# Patient Record
Sex: FEMALE | Race: WHITE | NOT HISPANIC OR LATINO | URBAN - METROPOLITAN AREA
[De-identification: names, ages, dates, MRNs, and addresses within clinical notes are randomized per-mention and may not be internally consistent; named-entity substitution may affect disease eponyms.]

---

## 2020-10-09 ENCOUNTER — OFFICE VISIT (OUTPATIENT)
Dept: URBAN - METROPOLITAN AREA CLINIC 27 | Facility: CLINIC | Age: 20
End: 2020-10-09

## 2020-10-09 PROBLEM — 444728005 CHRONIC INFLAMMATORY DEMYELINATING POLYNEUROPATHY: Status: ACTIVE | Noted: 2020-10-09

## 2020-10-09 PROBLEM — 16932000 NAUSEA AND VOMITING: Status: ACTIVE | Noted: 2020-10-09

## 2020-10-09 PROBLEM — 442728009 NON-CELIAC GLUTEN SENSITIVITY: Status: ACTIVE | Noted: 2020-10-09

## 2020-10-09 PROBLEM — 102614006 GENERALIZED ABDOMINAL PAIN: Status: ACTIVE | Noted: 2020-10-09

## 2020-10-09 PROBLEM — 266435005 GASTRO-ESOPHAGEAL REFLUX DISEASE WITHOUT ESOPHAGITIS: Status: ACTIVE | Noted: 2020-10-09

## 2020-10-28 ENCOUNTER — LAB OUTSIDE AN ENCOUNTER (OUTPATIENT)
Dept: URBAN - METROPOLITAN AREA CLINIC 121 | Facility: CLINIC | Age: 20
End: 2020-10-28

## 2020-10-28 LAB
A/G RATIO: (no result)
ALBUMIN: 4.2
ALK PHOS: 49
AMYLASE: 51
BASOPH COUNT: (no result)
BASOPHIL %: 0.7
BG RANDOM: 78
BILI TOTAL: 0.7
BUN/CREAT: (no result)
BUN: 12
CALCIUM: 9.3
CHLORIDE: 105
CO2: 23
CREATININE: 0.42
EOS COUNT: (no result)
EOSINOPHIL %: 1.7
FERRITIN: 57
GLOBULIN TOT: (no result)
HCT: 41.2
HGB: 14.1
HGBA1C: (no result)
IRON SATUR %: (no result)
IRON: 150
LYMPHS %: 30
MCH: 32.6
MCHC: (no result)
MCV: 95.2
MONOCYTE %: 8.1
MONOSCT AUTO: (no result)
PLATELETS: (no result)
PMN %: 59.5
POTASSIUM: 4.1
PROTEIN, TOT: 7
RBC: (no result)
RDW: 12.1
SGOT (AST): 18
SGPT (ALT): 12
TIBC: (no result)
TSH: 1
WBC: (no result)
ZZ-GE-UNK: (no result)

## 2020-11-06 ENCOUNTER — OFFICE VISIT (OUTPATIENT)
Dept: URBAN - METROPOLITAN AREA CLINIC 27 | Facility: CLINIC | Age: 20
End: 2020-11-06

## 2020-11-06 PROBLEM — 267425008 LACTOSE INTOLERANCE: Status: ACTIVE | Noted: 2020-11-06

## 2020-12-08 ENCOUNTER — OFFICE VISIT (OUTPATIENT)
Dept: URBAN - METROPOLITAN AREA SURGERY CENTER 7 | Facility: SURGERY CENTER | Age: 20
End: 2020-12-08

## 2022-04-30 ENCOUNTER — TELEPHONE ENCOUNTER (OUTPATIENT)
Dept: URBAN - METROPOLITAN AREA CLINIC 121 | Facility: CLINIC | Age: 22
End: 2022-04-30

## 2022-05-01 ENCOUNTER — TELEPHONE ENCOUNTER (OUTPATIENT)
Dept: URBAN - METROPOLITAN AREA CLINIC 121 | Facility: CLINIC | Age: 22
End: 2022-05-01

## 2022-05-01 RX ORDER — ONDANSETRON 8 MG/1
TAKE 1/2 OR 1 TABLET BY MOUTH EVERY 8HRS AS NEEDED FOR NAUSEA TABLET ORAL
Status: ACTIVE | COMMUNITY
Start: 2020-10-09

## 2022-05-01 RX ORDER — HYOSCYAMINE SULFATE 0.12 MG/1
TAKE ONE OR TWO TABLETS BY MOUTH EVERY 6HRS AS NEEDED FOR ABDOMINAL PAIN TABLET ORAL
Status: ACTIVE | COMMUNITY
Start: 2020-10-09

## 2022-05-01 RX ORDER — PREDNISONE 20 MG/1
TABLET ORAL
Status: ACTIVE | COMMUNITY
Start: 2020-10-09

## 2023-10-27 ENCOUNTER — CLAIMS CREATED FROM THE CLAIM WINDOW (OUTPATIENT)
Dept: URBAN - METROPOLITAN AREA CLINIC 27 | Facility: CLINIC | Age: 23
End: 2023-10-27
Payer: COMMERCIAL

## 2023-10-27 ENCOUNTER — DASHBOARD ENCOUNTERS (OUTPATIENT)
Age: 23
End: 2023-10-27

## 2023-10-27 ENCOUNTER — TELEPHONE ENCOUNTER (OUTPATIENT)
Dept: URBAN - METROPOLITAN AREA CLINIC 27 | Facility: CLINIC | Age: 23
End: 2023-10-27

## 2023-10-27 VITALS
SYSTOLIC BLOOD PRESSURE: 130 MMHG | WEIGHT: 109 LBS | HEIGHT: 60 IN | BODY MASS INDEX: 21.4 KG/M2 | HEART RATE: 80 BPM | DIASTOLIC BLOOD PRESSURE: 103 MMHG

## 2023-10-27 DIAGNOSIS — R11.0 NAUSEA: ICD-10-CM

## 2023-10-27 DIAGNOSIS — R10.33 PERIUMBILICAL PAIN: ICD-10-CM

## 2023-10-27 PROCEDURE — 99244 OFF/OP CNSLTJ NEW/EST MOD 40: CPT | Performed by: INTERNAL MEDICINE

## 2023-10-27 PROCEDURE — 99204 OFFICE O/P NEW MOD 45 MIN: CPT | Performed by: INTERNAL MEDICINE

## 2023-10-27 RX ORDER — ONDANSETRON 4 MG/1
1 TABLET ON THE TONGUE AND ALLOW TO DISSOLVE TABLET, ORALLY DISINTEGRATING ORAL EVERY 6 HOURS
Qty: 30 TABLET | Refills: 1 | OUTPATIENT
Start: 2023-10-27

## 2023-10-27 RX ORDER — ONDANSETRON 8 MG/1
TAKE 1/2 OR 1 TABLET BY MOUTH EVERY 8HRS AS NEEDED FOR NAUSEA TABLET ORAL
Status: ACTIVE | COMMUNITY
Start: 2020-10-09

## 2023-10-27 RX ORDER — HYOSCYAMINE SULFATE 0.12 MG/1
TAKE ONE OR TWO TABLETS BY MOUTH EVERY 6HRS AS NEEDED FOR ABDOMINAL PAIN TABLET ORAL
Status: ACTIVE | COMMUNITY
Start: 2020-10-09

## 2023-10-27 RX ORDER — HYOSCYAMINE SULFATE 0.12 MG/1
1 TABLET UNDER THE TONGUE AND ALLOW TO DISSOLVE AS NEEDED TABLET, ORALLY DISINTEGRATING ORAL THREE TIMES A DAY
Qty: 90 | Refills: 1 | OUTPATIENT
Start: 2023-10-27 | End: 2023-12-25

## 2023-10-27 RX ORDER — PREDNISONE 20 MG/1
TABLET ORAL
Status: ACTIVE | COMMUNITY
Start: 2020-10-09

## 2023-10-27 NOTE — PHYSICAL EXAM HENT:
Head,  normocephalic,  atraumatic,  Face,  Face within normal limits,  Ears,  External ears within normal limits,  Nose/Nasopharynx,  External nose  normal appearance,  nares patent,  no nasal discharge,  Lips,  Appearance normal

## 2023-10-27 NOTE — HPI-TODAY'S VISIT:
Ms Lehman is a 23-year-old established patient of Dr. Cruz seen at the request of Dr. Beba Ramírez  for nausea, vomiting, abdominal pain. A copy of this document will be sent to the referring physician. She was in her usual state of health until 3 weeks ago. Her initial symptom was mild abdominal discomfort followed by vomiting. She has constant nausea that is worse after eating. Zofran helps. The last emesis was 10 days ago. She has constant 7/10 periumbilical pain x 3 week. Not affected by eating, defecation or passage of flatus. She has a BM QOD. She denies melena, hematochezia or weight loss. She was seen at an Urgent care 2 weeks who prescribed Zofran and Bentyl. No improvement with taking Bentyl qid. She had hallucinations and stopped the medication. She saw Dr. Ramírez last week who started Omeprazole 40 mg q day, Carafate qid. She reports a 30% improvement since starting the medications.   EGD 12/2020 - mild erythema in distal esophagus and somewhat patulous esophagus.

## 2023-11-01 ENCOUNTER — OFFICE VISIT (OUTPATIENT)
Dept: URBAN - METROPOLITAN AREA CLINIC 27 | Facility: CLINIC | Age: 23
End: 2023-11-01

## 2023-11-01 ENCOUNTER — OFFICE VISIT (OUTPATIENT)
Dept: URBAN - METROPOLITAN AREA SURGERY CENTER 7 | Facility: SURGERY CENTER | Age: 23
End: 2023-11-01
Payer: COMMERCIAL

## 2023-11-01 ENCOUNTER — TELEPHONE ENCOUNTER (OUTPATIENT)
Dept: URBAN - METROPOLITAN AREA CLINIC 27 | Facility: CLINIC | Age: 23
End: 2023-11-01

## 2023-11-01 ENCOUNTER — CLAIMS CREATED FROM THE CLAIM WINDOW (OUTPATIENT)
Dept: URBAN - METROPOLITAN AREA CLINIC 4 | Facility: CLINIC | Age: 23
End: 2023-11-01
Payer: COMMERCIAL

## 2023-11-01 DIAGNOSIS — K31.89 MUCOSAL ABNORMALITY OF STOMACH: ICD-10-CM

## 2023-11-01 DIAGNOSIS — R11.2 ACUTE NAUSEA WITH NONBILIOUS VOMITING: ICD-10-CM

## 2023-11-01 DIAGNOSIS — K29.70 GASTRITIS, UNSPECIFIED, WITHOUT BLEEDING: ICD-10-CM

## 2023-11-01 DIAGNOSIS — K29.81 DUODENITIS WITH BLEEDING: ICD-10-CM

## 2023-11-01 DIAGNOSIS — K29.80 ACUTE DUODENITIS: ICD-10-CM

## 2023-11-01 DIAGNOSIS — R10.33 ABDOMINAL PAIN, ACUTE, PERIUMBILICAL: ICD-10-CM

## 2023-11-01 DIAGNOSIS — K21.9 ACID REFLUX: ICD-10-CM

## 2023-11-01 DIAGNOSIS — K44.9 HIATAL HERNIA: ICD-10-CM

## 2023-11-01 DIAGNOSIS — K22.9 IRREGULAR Z LINE OF ESOPHAGUS: ICD-10-CM

## 2023-11-01 PROCEDURE — 43239 EGD BIOPSY SINGLE/MULTIPLE: CPT | Performed by: INTERNAL MEDICINE

## 2023-11-01 PROCEDURE — G8907 PT DOC NO EVENTS ON DISCHARG: HCPCS | Performed by: INTERNAL MEDICINE

## 2023-11-01 PROCEDURE — 00731 ANES UPR GI NDSC PX NOS: CPT | Performed by: NURSE ANESTHETIST, CERTIFIED REGISTERED

## 2023-11-01 PROCEDURE — 88312 SPECIAL STAINS GROUP 1: CPT | Performed by: PATHOLOGY

## 2023-11-01 PROCEDURE — 88305 TISSUE EXAM BY PATHOLOGIST: CPT | Performed by: PATHOLOGY

## 2023-11-01 RX ORDER — ONDANSETRON 4 MG/1
1 TABLET ON THE TONGUE AND ALLOW TO DISSOLVE TABLET, ORALLY DISINTEGRATING ORAL EVERY 6 HOURS
Qty: 30 TABLET | Refills: 1 | Status: ACTIVE | COMMUNITY
Start: 2023-10-27

## 2023-11-01 RX ORDER — ONDANSETRON 8 MG/1
TAKE 1/2 OR 1 TABLET BY MOUTH EVERY 8HRS AS NEEDED FOR NAUSEA TABLET ORAL
Status: ACTIVE | COMMUNITY
Start: 2020-10-09

## 2023-11-01 RX ORDER — HYOSCYAMINE SULFATE 0.12 MG/1
1 TABLET UNDER THE TONGUE AND ALLOW TO DISSOLVE AS NEEDED TABLET, ORALLY DISINTEGRATING ORAL THREE TIMES A DAY
Qty: 90 | Refills: 1 | Status: ACTIVE | COMMUNITY
Start: 2023-10-27 | End: 2023-12-25

## 2023-11-01 RX ORDER — PREDNISONE 20 MG/1
TABLET ORAL
Status: ACTIVE | COMMUNITY
Start: 2020-10-09

## 2023-11-01 RX ORDER — HYOSCYAMINE SULFATE 0.12 MG/1
TAKE ONE OR TWO TABLETS BY MOUTH EVERY 6HRS AS NEEDED FOR ABDOMINAL PAIN TABLET ORAL
Status: ACTIVE | COMMUNITY
Start: 2020-10-09

## 2023-11-02 ENCOUNTER — TELEPHONE ENCOUNTER (OUTPATIENT)
Dept: URBAN - METROPOLITAN AREA CLINIC 27 | Facility: CLINIC | Age: 23
End: 2023-11-02

## 2023-11-03 ENCOUNTER — TELEPHONE ENCOUNTER (OUTPATIENT)
Dept: URBAN - METROPOLITAN AREA CLINIC 27 | Facility: CLINIC | Age: 23
End: 2023-11-03

## 2023-12-04 ENCOUNTER — OFFICE VISIT (OUTPATIENT)
Dept: URBAN - METROPOLITAN AREA CLINIC 27 | Facility: CLINIC | Age: 23
End: 2023-12-04

## 2023-12-04 RX ORDER — HYOSCYAMINE SULFATE 0.12 MG/1
TAKE ONE OR TWO TABLETS BY MOUTH EVERY 6HRS AS NEEDED FOR ABDOMINAL PAIN TABLET ORAL
Status: ACTIVE | COMMUNITY
Start: 2020-10-09

## 2023-12-04 RX ORDER — ONDANSETRON 4 MG/1
1 TABLET ON THE TONGUE AND ALLOW TO DISSOLVE TABLET, ORALLY DISINTEGRATING ORAL EVERY 6 HOURS
Qty: 30 TABLET | Refills: 1 | Status: ACTIVE | COMMUNITY
Start: 2023-10-27

## 2023-12-04 RX ORDER — HYOSCYAMINE SULFATE 0.12 MG/1
1 TABLET UNDER THE TONGUE AND ALLOW TO DISSOLVE AS NEEDED TABLET, ORALLY DISINTEGRATING ORAL THREE TIMES A DAY
Qty: 90 | Refills: 1 | Status: ACTIVE | COMMUNITY
Start: 2023-10-27 | End: 2023-12-25

## 2023-12-04 RX ORDER — ONDANSETRON 8 MG/1
TAKE 1/2 OR 1 TABLET BY MOUTH EVERY 8HRS AS NEEDED FOR NAUSEA TABLET ORAL
Status: ACTIVE | COMMUNITY
Start: 2020-10-09

## 2023-12-04 RX ORDER — PREDNISONE 20 MG/1
TABLET ORAL
Status: ACTIVE | COMMUNITY
Start: 2020-10-09

## 2023-12-28 ENCOUNTER — TELEPHONE ENCOUNTER (OUTPATIENT)
Dept: URBAN - METROPOLITAN AREA CLINIC 27 | Facility: CLINIC | Age: 23
End: 2023-12-28

## 2023-12-28 RX ORDER — HYOSCYAMINE SULFATE 0.12 MG/1
TAKE ONE OR TWO TABLETS FOR ABDOMINAL PAIN TABLET ORAL
Qty: 180 TABLET | Refills: 3
Start: 2020-10-09 | End: 2024-04-27

## 2025-02-18 ENCOUNTER — OFFICE VISIT (OUTPATIENT)
Dept: URBAN - METROPOLITAN AREA CLINIC 27 | Facility: CLINIC | Age: 25
End: 2025-02-18

## 2025-02-20 ENCOUNTER — OFFICE VISIT (OUTPATIENT)
Dept: URBAN - METROPOLITAN AREA CLINIC 27 | Facility: CLINIC | Age: 25
End: 2025-02-20

## 2025-02-20 RX ORDER — PREDNISONE 20 MG/1
TABLET ORAL
Status: ACTIVE | COMMUNITY
Start: 2020-10-09

## 2025-02-20 RX ORDER — ONDANSETRON 4 MG/1
1 TABLET ON THE TONGUE AND ALLOW TO DISSOLVE TABLET, ORALLY DISINTEGRATING ORAL EVERY 6 HOURS
Qty: 30 TABLET | Refills: 1 | Status: ACTIVE | COMMUNITY
Start: 2023-10-27

## 2025-02-20 RX ORDER — ONDANSETRON 8 MG/1
TAKE 1/2 OR 1 TABLET BY MOUTH EVERY 8HRS AS NEEDED FOR NAUSEA TABLET ORAL
Status: ACTIVE | COMMUNITY
Start: 2020-10-09